# Patient Record
(demographics unavailable — no encounter records)

---

## 2024-10-28 NOTE — PLAN
[FreeTextEntry1] : #MCI vs MDD - Neuro referral for neuropsych testing - [facility] referral - Pt has seen Dr. Foote in the past, will re-engage - Was on sertraline for MDD but this was stopped back in february due to concern of increased appetite. her PHQ today is 12, though at that time it was 24. - Resume sertraline 50mg qd  #DM - Continue metformin 850 BID. Better controlled on diet.   #HTN - uncontrolled - Add amlodipine 10 - Continue lisinopril 40mg qd and hydral 10mg TID - TTE from in hospital noted: normal LVSF and RFSF - If still elevated would add hctz (was on it in the past?)  2x medication lists given to patient and daughter. Medication reconciliation performed. RTC 5 weeks for BP and mood check, and to ensure follow ups.

## 2024-10-28 NOTE — PHYSICAL EXAM
[No Acute Distress] : no acute distress [Well Nourished] : well nourished [Well Developed] : well developed [Normal] : soft, non-tender, non-distended, no masses palpated, no HSM and normal bowel sounds [de-identified] : right eye slight conjunctival injection. Large but reactive pupils b/l [de-identified] : 1-2+ LE edema b/l [de-identified] : Right side slightly weaker than left.  [de-identified] : MMSE ~24. Losing points for date, recall, serial 7's,

## 2024-10-28 NOTE — HISTORY OF PRESENT ILLNESS
[FreeTextEntry1] : 71 YOF with DM, HTN, HLD, glaucoma, venous insufficiency, SAH, mild cognitive impairment?, who presents for A1C check and follow up.   [de-identified] : 71 YOF with DM, HTN, HLD, glaucoma, venous insufficiency, SAH, mild cognitive impairment?, who presents for A1C check and follow up.  Daughter Joana here today ([phone]) Sister MARIO Nevarez ([phone])  #MCI - Pt reports that she forgets names. Thinks that this started after the SAH. Had seen neuro in february was referred to psych, but they did not go.  - Was given geriatrics referral last visit, but did not go. - Former  and often states that she wants to go back to work. She is also fixated on her SAH - feels that she was in the hospital for days but was actually there for much longer. Additionally, she reports that she would like to speak to other people who have had SAH in the past.  #DM - POC A1C 7-->5.9  #Meds - atorvastatin 40mg qhs - Metformin 850 BID - Hydralazine 50mg TID - Lisinopril 40mg qd

## 2024-10-28 NOTE — ASSESSMENT
[FreeTextEntry1] : 71 YOF with DM, HTN, HLD, glaucoma, venous insufficiency, SAH, mild cognitive impairment?, who presents for A1C check and follow up.

## 2025-04-20 NOTE — PHYSICAL EXAM
[No Acute Distress] : no acute distress [Well-Appearing] : well-appearing [Normal Sclera/Conjunctiva] : normal sclera/conjunctiva [PERRL] : pupils equal round and reactive to light [EOMI] : extraocular movements intact [Normal Outer Ear/Nose] : the outer ears and nose were normal in appearance [Normal Oropharynx] : the oropharynx was normal [Normal TMs] : both tympanic membranes were normal [No JVD] : no jugular venous distention [No Lymphadenopathy] : no lymphadenopathy [No Respiratory Distress] : no respiratory distress  [No Accessory Muscle Use] : no accessory muscle use [Clear to Auscultation] : lungs were clear to auscultation bilaterally [Normal Rate] : normal rate  [Regular Rhythm] : with a regular rhythm [Normal S1, S2] : normal S1 and S2 [No Abdominal Bruit] : a ~M bruit was not heard ~T in the abdomen [No Edema] : there was no peripheral edema [No Extremity Clubbing/Cyanosis] : no extremity clubbing/cyanosis [Soft] : abdomen soft [Non Tender] : non-tender [Non-distended] : non-distended [No Masses] : no abdominal mass palpated [Normal Supraclavicular Nodes] : no supraclavicular lymphadenopathy [Normal Anterior Cervical Nodes] : no anterior cervical lymphadenopathy [No CVA Tenderness] : no CVA  tenderness [No Spinal Tenderness] : no spinal tenderness [No Joint Swelling] : no joint swelling [Grossly Normal Strength/Tone] : grossly normal strength/tone [No Rash] : no rash [Coordination Grossly Intact] : coordination grossly intact [No Focal Deficits] : no focal deficits [Normal Affect] : the affect was normal [Normal Insight/Judgement] : insight and judgment were intact [de-identified] : Impaired visual fields, especially peripherally [de-identified] : Seems to have some memory impairment

## 2025-04-20 NOTE — HEALTH RISK ASSESSMENT
[Fully functional (bathing, dressing, toileting, transferring, walking, feeding)] : Fully functional (bathing, dressing, toileting, transferring, walking, feeding) [Independent] : using telephone [Some assistance needed] : managing medications [Never (0 pts)] : Never (0 points) [Two or more falls in past year] : Patient reported two or more falls in the past year [Assistive Device] : Patient uses an assistive device [Never] : Never [Patient declined colonoscopy] : Patient declined colonoscopy [Full assistance needed] : managing finances [Fair] :  ~his/her~ mood as fair [No] : In the past 12 months have you used drugs other than those required for medical reasons? No [NO] : No [Patient declined mammogram] : Patient declined mammogram [Patient declined PAP Smear] : Patient declined PAP Smear [Patient declined bone density test] : Patient declined bone density test [Handling Complex Tasks] : difficulty handling complex tasks [Alone] : lives alone [Unemployed] : unemployed [Single] : single [Change in mental status noted] : Change in mental status noted [Financial] : financial [Housing] : housing [Behavioral] : behavioral [ SDOH Screening] : Social service referral provided to patient and briefly addressed in the office. [Time Spent: ___ Minutes] : I spent [unfilled] minutes performing an SDOH assessment. [Audit-CScore] : 0 [de-identified] : uses cane [Language] : denies difficulty with language [Behavior] : denies difficulty with behavior [Learning/Retaining New Information] : denies difficulty learning/retaining new information [Reasoning] : denies difficulty with reasoning [Spatial Ability and Orientation] : denies difficulty with spatial ability and orientation [Reports changes in hearing] : Reports no changes in hearing [Reports changes in vision] : Reports no changes in vision [Reports normal functional visual acuity (ie: able to read med bottle)] : Reports poor functional visual acuity.  [ColonoscopyComments] : Declined in favor of stool test [de-identified] : home safety, fall prevention, access to care [de-identified] : see HPI  [de-identified] : Referred to SW - goal to facilitate medicaid to assist with access to medication, support a home.

## 2025-04-20 NOTE — PLAN
[FreeTextEntry1] : #Health Maintenance - Significant social risk factors at home to be addressed - RTC in 5 weeks with medication list and list of specialists she sees - Refer to [facility] for medicaid enrollment - Home functional status visit, needs medicaid first, recent recurrent falls especially of concern - along with cognitive decline, need for help at home for ADL's - Best Contact is son, Benedict Alva, [phone] - Agreeable to labs, would like to wait for health maintenance screening  #Mild Cognitive Impairment - Neuro referral for cognitive evaluation  #T2DM - Meds unclear, clarify at next visit - F/u A1c  #Hypertension - BP today 138/86 - Meds unclear, clarify at next visit

## 2025-04-20 NOTE — PLAN
[FreeTextEntry1] : #Health Maintenance - Significant social risk factors at home to be addressed - RTC in 5 weeks with medication list and list of specialists she sees - Refer to [facility] for medicaid enrollment - Home functional status visit, needs medicaid first, recent recurrent falls especially of concern - along with cognitive decline, need for help at home for ADL's - Best Contact is son, Benedict Alva, [phone] - Agreeable to labs, would like to wait for health maintenance screening  #Mild Cognitive Impairment - Neuro referral for cognitive evaluation  #T2DM - Meds unclear, clarify at next visit - F/u A1c  #Hypertension - BP today 138/86 - Meds unclear, clarify at next visit

## 2025-04-20 NOTE — HISTORY OF PRESENT ILLNESS
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   Since her last visit she had back surgery in February, for fusion. Eyesight is bad from Glaucoma, now legally blind as of approximately 2-3 years ago, She continues to get intraocular injection monthly with [facility] in [place].  She currently lives alone, and is having difficulty managing life at home. Her last fall was march of 2024 requiring hospitalization, and since then has had no falls however feels increasingly unsteady on her feet. She no longer feels it is safe to cook on her own. Has had issues with burning things in the past. She has 4 total children, 1 in [place], 3 in [facility], who check in on her approximately 3 times a week. Her daughter Joana is with her today and also helps manage her medications at home, however both the patient and her daughter are unclear on which medications she currently takes and they do not have a list with them. They would like to enroll her in Medicaid for help at home.

## 2025-04-20 NOTE — HEALTH RISK ASSESSMENT
[Fully functional (bathing, dressing, toileting, transferring, walking, feeding)] : Fully functional (bathing, dressing, toileting, transferring, walking, feeding) [Independent] : using telephone [Some assistance needed] : managing medications [Never (0 pts)] : Never (0 points) [Two or more falls in past year] : Patient reported two or more falls in the past year [Assistive Device] : Patient uses an assistive device [Never] : Never [Patient declined colonoscopy] : Patient declined colonoscopy [Full assistance needed] : managing finances [Fair] :  ~his/her~ mood as fair [No] : In the past 12 months have you used drugs other than those required for medical reasons? No [NO] : No [Patient declined mammogram] : Patient declined mammogram [Patient declined PAP Smear] : Patient declined PAP Smear [Patient declined bone density test] : Patient declined bone density test [Handling Complex Tasks] : difficulty handling complex tasks [Alone] : lives alone [Unemployed] : unemployed [Single] : single [Change in mental status noted] : Change in mental status noted [Financial] : financial [Housing] : housing [Behavioral] : behavioral [ SDOH Screening] : Social service referral provided to patient and briefly addressed in the office. [Time Spent: ___ Minutes] : I spent [unfilled] minutes performing an SDOH assessment. [Audit-CScore] : 0 [de-identified] : uses cane [Language] : denies difficulty with language [Behavior] : denies difficulty with behavior [Learning/Retaining New Information] : denies difficulty learning/retaining new information [Reasoning] : denies difficulty with reasoning [Spatial Ability and Orientation] : denies difficulty with spatial ability and orientation [Reports changes in hearing] : Reports no changes in hearing [Reports changes in vision] : Reports no changes in vision [Reports normal functional visual acuity (ie: able to read med bottle)] : Reports poor functional visual acuity.  [ColonoscopyComments] : Declined in favor of stool test [de-identified] : home safety, fall prevention, access to care [de-identified] : see HPI  [de-identified] : Referred to SW - goal to facilitate medicaid to assist with access to medication, support a home.

## 2025-04-20 NOTE — HEALTH RISK ASSESSMENT
[Fully functional (bathing, dressing, toileting, transferring, walking, feeding)] : Fully functional (bathing, dressing, toileting, transferring, walking, feeding) [Independent] : using telephone [Some assistance needed] : managing medications [Never (0 pts)] : Never (0 points) [Two or more falls in past year] : Patient reported two or more falls in the past year [Assistive Device] : Patient uses an assistive device [Never] : Never [Patient declined colonoscopy] : Patient declined colonoscopy [Full assistance needed] : managing finances [Fair] :  ~his/her~ mood as fair [No] : In the past 12 months have you used drugs other than those required for medical reasons? No [NO] : No [Patient declined mammogram] : Patient declined mammogram [Patient declined PAP Smear] : Patient declined PAP Smear [Patient declined bone density test] : Patient declined bone density test [Handling Complex Tasks] : difficulty handling complex tasks [Alone] : lives alone [Unemployed] : unemployed [Single] : single [Change in mental status noted] : Change in mental status noted [Financial] : financial [Housing] : housing [Behavioral] : behavioral [ SDOH Screening] : Social service referral provided to patient and briefly addressed in the office. [Time Spent: ___ Minutes] : I spent [unfilled] minutes performing an SDOH assessment. [Audit-CScore] : 0 [de-identified] : uses cane [Language] : denies difficulty with language [Behavior] : denies difficulty with behavior [Learning/Retaining New Information] : denies difficulty learning/retaining new information [Reasoning] : denies difficulty with reasoning [Spatial Ability and Orientation] : denies difficulty with spatial ability and orientation [Reports changes in hearing] : Reports no changes in hearing [Reports changes in vision] : Reports no changes in vision [Reports normal functional visual acuity (ie: able to read med bottle)] : Reports poor functional visual acuity.  [ColonoscopyComments] : Declined in favor of stool test [de-identified] : home safety, fall prevention, access to care [de-identified] : see HPI  [de-identified] : Referred to SW - goal to facilitate medicaid to assist with access to medication, support a home.

## 2025-04-20 NOTE — HISTORY OF PRESENT ILLNESS
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   Since her last visit she had back surgery in February, for fusion. Eyesight is bad from Glaucoma, now legally blind as of approximately 2-3 years ago, She continues to get intraocular injection monthly with [facility] in [place].  She currently lives alone, and is having difficulty managing life at home. Her last fall was march of 2024 requiring hospitalization, and since then has had no falls however feels increasingly unsteady on her feet. She no longer feels it is safe to cook on her own. Has had issues with burning things in the past. She has 4 total children, 1 in [place], 3 in [facility] [place], who check in on her approximately 3 times a week. Her daughter Joana is with her today and also helps manage her medications at home, however both the patient and her daughter are unclear on which medications she currently takes and they do not have a list with them. They would like to enroll her in Medicaid for help at home.

## 2025-04-20 NOTE — HISTORY OF PRESENT ILLNESS
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   Since her last visit she had back surgery in February, for fusion. Eyesight is bad from Glaucoma, now legally blind as of approximately 2-3 years ago, She continues to get intraocular injection monthly with [facility] in [place].  She currently lives alone, and is having difficulty managing life at home. Her last fall was march of 2024 requiring hospitalization, and since then has had no falls however feels increasingly unsteady on her feet. She no longer feels it is safe to cook on her own. Has had issues with burning things in the past. She has 4 total children, 1 in [place], 3 in [facility], who check in on her approximately 3 times a week. Her daughter Joana is with her today and also helps manage her medications at home, however both the patient and her daughter are unclear on which medications she currently takes and they do not have a list with them. They would like to enroll her in Medicaid for help at home.

## 2025-04-20 NOTE — PHYSICAL EXAM
[No Acute Distress] : no acute distress [Well-Appearing] : well-appearing [Normal Sclera/Conjunctiva] : normal sclera/conjunctiva [PERRL] : pupils equal round and reactive to light [EOMI] : extraocular movements intact [Normal Outer Ear/Nose] : the outer ears and nose were normal in appearance [Normal Oropharynx] : the oropharynx was normal [Normal TMs] : both tympanic membranes were normal [No JVD] : no jugular venous distention [No Lymphadenopathy] : no lymphadenopathy [No Respiratory Distress] : no respiratory distress  [No Accessory Muscle Use] : no accessory muscle use [Clear to Auscultation] : lungs were clear to auscultation bilaterally [Normal Rate] : normal rate  [Regular Rhythm] : with a regular rhythm [Normal S1, S2] : normal S1 and S2 [No Abdominal Bruit] : a ~M bruit was not heard ~T in the abdomen [No Edema] : there was no peripheral edema [No Extremity Clubbing/Cyanosis] : no extremity clubbing/cyanosis [Soft] : abdomen soft [Non Tender] : non-tender [Non-distended] : non-distended [No Masses] : no abdominal mass palpated [Normal Supraclavicular Nodes] : no supraclavicular lymphadenopathy [Normal Anterior Cervical Nodes] : no anterior cervical lymphadenopathy [No CVA Tenderness] : no CVA  tenderness [No Spinal Tenderness] : no spinal tenderness [No Joint Swelling] : no joint swelling [Grossly Normal Strength/Tone] : grossly normal strength/tone [No Rash] : no rash [Coordination Grossly Intact] : coordination grossly intact [No Focal Deficits] : no focal deficits [Normal Affect] : the affect was normal [Normal Insight/Judgement] : insight and judgment were intact [de-identified] : Impaired visual fields, especially peripherally [de-identified] : Seems to have some memory impairment

## 2025-04-20 NOTE — REVIEW OF SYSTEMS
[Vision Problems] : vision problems [Unsteady Walking] : ataxia [Fever] : no fever [Chills] : no chills [Night Sweats] : no night sweats [Discharge] : no discharge [Pain] : no pain [Earache] : no earache [Hearing Loss] : no hearing loss [Sore Throat] : no sore throat [Chest Pain] : no chest pain [Palpitations] : no palpitations [Leg Claudication] : no leg claudication [Lower Ext Edema] : no lower extremity edema [Shortness Of Breath] : no shortness of breath [Cough] : no cough [Dyspnea on Exertion] : no dyspnea on exertion [Abdominal Pain] : no abdominal pain [Nausea] : no nausea [Constipation] : no constipation [Diarrhea] : diarrhea [Vomiting] : no vomiting [Melena] : no melena [Dysuria] : no dysuria [Hematuria] : no hematuria [Joint Pain] : no joint pain [Muscle Pain] : no muscle pain [Itching] : no itching [Skin Rash] : no skin rash [Dizziness] : no dizziness [Fainting] : no fainting [Anxiety] : no anxiety [Depression] : no depression [Easy Bleeding] : no easy bleeding [Easy Bruising] : no easy bruising

## 2025-04-20 NOTE — PHYSICAL EXAM
[No Acute Distress] : no acute distress [Well-Appearing] : well-appearing [Normal Sclera/Conjunctiva] : normal sclera/conjunctiva [PERRL] : pupils equal round and reactive to light [EOMI] : extraocular movements intact [Normal Oropharynx] : the oropharynx was normal [Normal Outer Ear/Nose] : the outer ears and nose were normal in appearance [Normal TMs] : both tympanic membranes were normal [No JVD] : no jugular venous distention [No Lymphadenopathy] : no lymphadenopathy [No Respiratory Distress] : no respiratory distress  [No Accessory Muscle Use] : no accessory muscle use [Clear to Auscultation] : lungs were clear to auscultation bilaterally [Normal Rate] : normal rate  [Regular Rhythm] : with a regular rhythm [Normal S1, S2] : normal S1 and S2 [No Abdominal Bruit] : a ~M bruit was not heard ~T in the abdomen [No Edema] : there was no peripheral edema [Soft] : abdomen soft [No Extremity Clubbing/Cyanosis] : no extremity clubbing/cyanosis [Non Tender] : non-tender [Non-distended] : non-distended [No Masses] : no abdominal mass palpated [Normal Supraclavicular Nodes] : no supraclavicular lymphadenopathy [Normal Anterior Cervical Nodes] : no anterior cervical lymphadenopathy [No CVA Tenderness] : no CVA  tenderness [No Spinal Tenderness] : no spinal tenderness [No Joint Swelling] : no joint swelling [Grossly Normal Strength/Tone] : grossly normal strength/tone [No Rash] : no rash [Coordination Grossly Intact] : coordination grossly intact [No Focal Deficits] : no focal deficits [Normal Affect] : the affect was normal [Normal Insight/Judgement] : insight and judgment were intact [de-identified] : Impaired visual fields, especially peripherally [de-identified] : Seems to have some memory impairment

## 2025-04-20 NOTE — PLAN
[FreeTextEntry1] : #Health Maintenance - Significant social risk factors at home to be addressed - RTC in 5 weeks with medication list and list of specialists she sees - Refer to [facility] for medicaid enrollment - Home functional status visit, needs medicaid first, recent recurrent falls especially of concern - along with cognitive decline, need for help at home for ADL's - Best Contact is son, Benedict Alva, [phone] - Agreeable to labs, would like to wait for health maintenance screening  #Mild Cognitive Impairment - Neuro referral for cognitive evaluation  #T2DM - Meds unclear, clarify at next visit - F/u A1c  #Hypertension - BP today 138/86 - Meds unclear, clarify at next visit